# Patient Record
Sex: FEMALE | Race: WHITE | NOT HISPANIC OR LATINO
[De-identification: names, ages, dates, MRNs, and addresses within clinical notes are randomized per-mention and may not be internally consistent; named-entity substitution may affect disease eponyms.]

---

## 2018-08-07 ENCOUNTER — RECORD ABSTRACTING (OUTPATIENT)
Age: 63
End: 2018-08-07

## 2018-08-07 DIAGNOSIS — Z86.69 PERSONAL HISTORY OF OTHER DISEASES OF THE NERVOUS SYSTEM AND SENSE ORGANS: ICD-10-CM

## 2018-08-07 DIAGNOSIS — Z86.79 PERSONAL HISTORY OF OTHER DISEASES OF THE CIRCULATORY SYSTEM: ICD-10-CM

## 2018-08-07 DIAGNOSIS — Z87.898 PERSONAL HISTORY OF OTHER SPECIFIED CONDITIONS: ICD-10-CM

## 2018-08-07 LAB — HBA1C MFR BLD: 7.6

## 2018-08-07 RX ORDER — ATORVASTATIN CALCIUM 20 MG/1
20 TABLET, FILM COATED ORAL DAILY
Refills: 0 | Status: ACTIVE | COMMUNITY

## 2018-08-07 RX ORDER — INSULIN ASPART 100 [IU]/ML
100 INJECTION, SOLUTION INTRAVENOUS; SUBCUTANEOUS
Refills: 0 | Status: ACTIVE | COMMUNITY

## 2018-08-24 ENCOUNTER — APPOINTMENT (OUTPATIENT)
Dept: ENDOCRINOLOGY | Facility: CLINIC | Age: 63
End: 2018-08-24
Payer: MEDICARE

## 2018-08-24 VITALS
HEIGHT: 61 IN | DIASTOLIC BLOOD PRESSURE: 80 MMHG | WEIGHT: 147 LBS | HEART RATE: 102 BPM | SYSTOLIC BLOOD PRESSURE: 136 MMHG | BODY MASS INDEX: 27.75 KG/M2

## 2018-08-24 LAB — GLUCOSE BLDC GLUCOMTR-MCNC: 112

## 2018-08-24 PROCEDURE — 82962 GLUCOSE BLOOD TEST: CPT

## 2018-08-24 PROCEDURE — 99214 OFFICE O/P EST MOD 30 MIN: CPT | Mod: 25

## 2018-08-24 RX ORDER — VALSARTAN AND HYDROCHLOROTHIAZIDE 160; 12.5 MG/1; MG/1
160-12.5 TABLET, FILM COATED ORAL DAILY
Refills: 0 | Status: DISCONTINUED | COMMUNITY
End: 2018-08-24

## 2018-11-19 ENCOUNTER — RECORD ABSTRACTING (OUTPATIENT)
Age: 63
End: 2018-11-19

## 2018-11-19 DIAGNOSIS — Z86.39 PERSONAL HISTORY OF OTHER ENDOCRINE, NUTRITIONAL AND METABOLIC DISEASE: ICD-10-CM

## 2018-11-19 DIAGNOSIS — F70 MILD INTELLECTUAL DISABILITIES: ICD-10-CM

## 2018-11-19 DIAGNOSIS — Z83.3 FAMILY HISTORY OF DIABETES MELLITUS: ICD-10-CM

## 2018-11-19 DIAGNOSIS — Z86.79 PERSONAL HISTORY OF OTHER DISEASES OF THE CIRCULATORY SYSTEM: ICD-10-CM

## 2018-11-19 DIAGNOSIS — Z78.9 OTHER SPECIFIED HEALTH STATUS: ICD-10-CM

## 2018-11-19 DIAGNOSIS — Z82.49 FAMILY HISTORY OF ISCHEMIC HEART DISEASE AND OTHER DISEASES OF THE CIRCULATORY SYSTEM: ICD-10-CM

## 2018-11-20 ENCOUNTER — APPOINTMENT (OUTPATIENT)
Dept: ENDOCRINOLOGY | Facility: CLINIC | Age: 63
End: 2018-11-20
Payer: MEDICARE

## 2018-11-20 VITALS
SYSTOLIC BLOOD PRESSURE: 122 MMHG | WEIGHT: 141 LBS | HEART RATE: 99 BPM | DIASTOLIC BLOOD PRESSURE: 70 MMHG | BODY MASS INDEX: 26.62 KG/M2 | HEIGHT: 61 IN

## 2018-11-20 LAB — GLUCOSE BLDC GLUCOMTR-MCNC: 240

## 2018-11-20 PROCEDURE — 99214 OFFICE O/P EST MOD 30 MIN: CPT | Mod: 25

## 2018-11-20 PROCEDURE — 82962 GLUCOSE BLOOD TEST: CPT

## 2018-11-20 RX ORDER — LEVOTHYROXINE SODIUM 0.1 MG/1
100 TABLET ORAL DAILY
Refills: 0 | Status: DISCONTINUED | COMMUNITY
End: 2018-11-20

## 2018-11-20 RX ORDER — CALCIUM CARBONATE/VITAMIN D2 500 MG-125
500-125 TABLET ORAL TWICE DAILY
Refills: 0 | Status: DISCONTINUED | COMMUNITY
End: 2018-11-20

## 2019-04-16 ENCOUNTER — APPOINTMENT (OUTPATIENT)
Dept: ENDOCRINOLOGY | Facility: CLINIC | Age: 64
End: 2019-04-16
Payer: MEDICARE

## 2019-04-16 VITALS
WEIGHT: 143 LBS | BODY MASS INDEX: 26.31 KG/M2 | DIASTOLIC BLOOD PRESSURE: 80 MMHG | HEART RATE: 95 BPM | SYSTOLIC BLOOD PRESSURE: 132 MMHG | OXYGEN SATURATION: 97 % | HEIGHT: 62 IN

## 2019-04-16 LAB — GLUCOSE BLDC GLUCOMTR-MCNC: 186

## 2019-04-16 PROCEDURE — 99214 OFFICE O/P EST MOD 30 MIN: CPT | Mod: 25

## 2019-04-16 PROCEDURE — 82962 GLUCOSE BLOOD TEST: CPT

## 2019-04-16 RX ORDER — OMEPRAZOLE 20 MG/1
20 CAPSULE, DELAYED RELEASE ORAL DAILY
Refills: 0 | Status: DISCONTINUED | COMMUNITY
End: 2019-04-16

## 2019-04-16 RX ORDER — LISINOPRIL AND HYDROCHLOROTHIAZIDE TABLETS 10; 12.5 MG/1; MG/1
10-12.5 TABLET ORAL DAILY
Refills: 0 | Status: ACTIVE | COMMUNITY

## 2019-04-16 RX ORDER — ERGOCALCIFEROL 1.25 MG/1
1.25 MG CAPSULE, LIQUID FILLED ORAL
Qty: 3 | Refills: 3 | Status: ACTIVE | COMMUNITY

## 2019-04-16 RX ORDER — CALCIUM CARBONATE/VITAMIN D2 500 MG-125
500-125 TABLET ORAL TWICE DAILY
Refills: 0 | Status: ACTIVE | COMMUNITY

## 2019-04-16 RX ORDER — ALENDRONATE SODIUM 70 MG/1
70 TABLET ORAL
Qty: 12 | Refills: 1 | Status: ACTIVE | COMMUNITY

## 2019-04-16 NOTE — ASSESSMENT
[FreeTextEntry1] : 64 year old female with Type 2 DM, Hypothyroidism, HTN, hyperlipidemia and osteoporosis.  Her glycemic control is acceptable at this time.  \par \par 1.  T2DM-  continue current medication regimen.  \par 2.  hypothyroidism-   Continue LT4 88  mcg daily.  \par 3.  Hyperlipidemia-  continue statin\par 4.  HTN-  continue ACE-I.  \par 5  OP-  continue alendronate 70 mg qweek,  Oscal and vitamin D.  Repeat DXA in 9/2020

## 2019-04-16 NOTE — DATA REVIEWED
[FreeTextEntry1] : Labs:\par 4/15/2019:\par A1c 6.5%\par TSH 0.51\par LDL  54\par \par 11/15/2018:\par TSH 0.214, Free T4  1.44\par A1c 6.6\par 25(OH)D  31\par LDL  55\par \par DXA  9/24/2018\par T score:  L spine  -1.4,  left hip  -2.8, left femoral neck:  -3.2

## 2019-04-16 NOTE — HISTORY OF PRESENT ILLNESS
[FreeTextEntry1] : Patient is seen today for a routine follow up of Type 2 DM. hypothyroidism and osteoporosis.\par Quality:  type 2  DM\par Severity:  moderate\par Duration of diabetes:  since 2013\par Associated Complications/ Symptoms:  no known microvascular complications\par Modifying Factors:  Better with insulin\par \par Patient tests blood glucose 3 times per day.   Reviewed log and most BG in the low to mid 100s with range of 77-  379 mg/dl.  Has occasional hyperglycemia before lunch based on her diet.  \par \par Current Diabetic Medication Regimen:\par Metformin  mg BID\par Lantus 40 units qhs\par Novolog 14 units with meals (+ scale 2:50 over 150).  \par \par  DXA showed OP at hip.  Alendronate was started in 11/2018.\par \par

## 2019-04-16 NOTE — PHYSICAL EXAM
[No Acute Distress] : no acute distress [Well Nourished] : well nourished [Well Developed] : well developed [Normal Sclera/Conjunctiva] : normal sclera/conjunctiva [No Proptosis] : no proptosis [No LAD] : no lymphadenopathy [No Neck Mass] : no neck mass was observed [Thyroid Not Enlarged] : the thyroid was not enlarged [No Thyroid Nodules] : there were no palpable thyroid nodules [Normal Rate and Effort] : normal respiratory rhythm and effort [Normal Rate] : heart rate was normal  [Clear to Auscultation] : lungs were clear to auscultation bilaterally [Regular Rhythm] : with a regular rhythm [Murmurs] : no murmurs [Normal S1, S2] : normal S1 and S2 [Normal Insight/Judgement] : insight and judgment were intact [No Edema] : there was no peripheral edema [Normal Affect] : the affect was normal [Normal Mood] : the mood was normal

## 2019-09-18 ENCOUNTER — APPOINTMENT (OUTPATIENT)
Dept: ENDOCRINOLOGY | Facility: CLINIC | Age: 64
End: 2019-09-18
Payer: MEDICARE

## 2019-09-18 VITALS — DIASTOLIC BLOOD PRESSURE: 72 MMHG | SYSTOLIC BLOOD PRESSURE: 114 MMHG | HEIGHT: 62 IN | HEART RATE: 88 BPM

## 2019-09-18 LAB — GLUCOSE BLDC GLUCOMTR-MCNC: 114

## 2019-09-18 PROCEDURE — 99214 OFFICE O/P EST MOD 30 MIN: CPT | Mod: 25

## 2019-09-18 PROCEDURE — 82962 GLUCOSE BLOOD TEST: CPT

## 2019-09-18 RX ORDER — INSULIN GLARGINE 100 [IU]/ML
100 INJECTION, SOLUTION SUBCUTANEOUS DAILY
Qty: 1 | Refills: 3 | Status: DISCONTINUED | COMMUNITY
End: 2019-09-18

## 2019-09-18 RX ORDER — INSULIN DETEMIR 100 [IU]/ML
INJECTION, SOLUTION SUBCUTANEOUS
Refills: 0 | Status: ACTIVE | COMMUNITY

## 2019-09-18 RX ORDER — PANTOPRAZOLE 40 MG/1
40 TABLET, DELAYED RELEASE ORAL
Refills: 0 | Status: DISCONTINUED | COMMUNITY
End: 2019-09-18

## 2019-09-18 NOTE — PHYSICAL EXAM
[No Acute Distress] : no acute distress [Well Nourished] : well nourished [Well Developed] : well developed [Normal Sclera/Conjunctiva] : normal sclera/conjunctiva [No Proptosis] : no proptosis [No Neck Mass] : no neck mass was observed [No LAD] : no lymphadenopathy [Thyroid Not Enlarged] : the thyroid was not enlarged [No Thyroid Nodules] : there were no palpable thyroid nodules [Normal Rate and Effort] : normal respiratory rhythm and effort [Clear to Auscultation] : lungs were clear to auscultation bilaterally [Normal Rate] : heart rate was normal  [Normal S1, S2] : normal S1 and S2 [Regular Rhythm] : with a regular rhythm [Murmurs] : no murmurs [No Edema] : there was no peripheral edema [Normal Insight/Judgement] : insight and judgment were intact [Normal Affect] : the affect was normal [Normal Mood] : the mood was normal [Acanthosis Nigricans] : no acanthosis nigricans

## 2019-09-18 NOTE — DATA REVIEWED
[FreeTextEntry1] : Labs:\par 9/14/2019:\par TSH 2.8\par LDL 57\par A1c 6.9%\par 25(OH)D 31\par \par 4/15/2019:\par A1c 6.5%\par TSH 0.51\par LDL  54\par \par 11/15/2018:\par TSH 0.214, Free T4  1.44\par A1c 6.6\par 25(OH)D  31\par LDL  55\par \par DXA  9/24/2018\par T score:  L spine  -1.4,  left hip  -2.8, left femoral neck:  -3.2

## 2019-09-18 NOTE — ASSESSMENT
[FreeTextEntry1] : 64 year old female with Type 2 DM, Hypothyroidism, HTN, hyperlipidemia and osteoporosis.  Her glycemic control is acceptable at this time.  \par \par 1.  T2DM-  continue current insulin regimen and metformin.  \par 2.  hypothyroidism-   Euthyroid, Continue LT4 88  mcg daily.  \par 3.  Hyperlipidemia-  continue statin\par 4.  HTN-  continue ACE-I.  \par 5  OP-  continue alendronate 70 mg qweek,  Oscal and vitamin D.  Repeat DXA in 9/2020

## 2019-09-18 NOTE — HISTORY OF PRESENT ILLNESS
[FreeTextEntry1] : Patient is seen today for a routine follow up of Type 2 DM. hypothyroidism and osteoporosis.   DXA showed OP at hip.  Alendronate was started in 11/2018.\par Quality:  type 2  DM\par Severity:  moderate\par Duration of diabetes:  since 2013\par Associated Complications/ Symptoms:  no known microvascular complications\par Modifying Factors:  Better with insulin\par \par Patient tests blood glucose 3-4  times per day.     Most BG in the 90- 200 mg/dl range.  Has rare spike over 200 mg/dl.  No recent hypoglycemia. \par \par Current Diabetic Medication Regimen:\par Metformin  mg BID\par levemir 40 units qhs\par Novolog 14 units with meals (+ scale 2:50 over 150).  \par \par \par \par

## 2020-05-18 ENCOUNTER — APPOINTMENT (OUTPATIENT)
Dept: ENDOCRINOLOGY | Facility: CLINIC | Age: 65
End: 2020-05-18

## 2020-08-25 ENCOUNTER — APPOINTMENT (OUTPATIENT)
Dept: ENDOCRINOLOGY | Facility: CLINIC | Age: 65
End: 2020-08-25
Payer: MEDICARE

## 2020-08-25 PROCEDURE — 99214 OFFICE O/P EST MOD 30 MIN: CPT | Mod: 95

## 2020-08-25 RX ORDER — CHOLECALCIFEROL (VITAMIN D3) 25 MCG
25 MCG TABLET,CHEWABLE ORAL DAILY
Refills: 0 | Status: DISCONTINUED | COMMUNITY
End: 2020-08-25

## 2020-08-25 NOTE — ASSESSMENT
[FreeTextEntry1] : 65 year old female with T2DM, hypothyroidism, osteoporosis, hypertension, hyperlipidemia, and vitamin D deficiency. Glycemic control is acceptable.\par \par 1. T2DM- A1c 7.3%, occasional post prandial hyperglycemia. Not a candidate for tight control given risk of hypoglycemia.\par -Continue Metformin.\par -Continue current insulin regimen.\par -Continue BG monitoring 4x daily.\par -Repeat A1c in 3 months.\par \par 2. Hypothyroidism- euthyroid on replacement T4.\par -Continue current dose of LT4.\par -Repeat TFTs in 3 months.\par \par 3. Hyperlipidemia- LDL 92\par -Continue statin. \par -Advise low fat diet.\par \par 4. Vitamin D deficiency- on vitamin D 50,000 IU once per month\par -Add vitamin D 1000 IU, one tablet by mouth daily.\par \par 5. Osteoporosis\par -Continue alendronate.\par -Continue calcium supplement.\par -Repeat DEXA September 2020.\par \par 6. Hypertension- BP today 131/64\par -Controlled, continue current regimen.

## 2020-08-25 NOTE — HISTORY OF PRESENT ILLNESS
[FreeTextEntry1] : This visit was provided via telehealth using real-time 2-way audio visual technology. The patient, DIONNE CARDENAS , was located at [facility], [address] [place] [address] , at the time of the visit. \par The provider, CODY COLLINS, was located at the medical office located in [place], [place] at the time of the visit. The patient's nurse, Jennifer, and , Alexy, participated in the telehealth encounter. \par \par Telehealth visit performed due to COVID-19 Pandemic.\par Time started: 1:45 pm\par Time Ended: 1:57 pm\par  \par Quality: type 2 DM\par Severity: moderate\par Duration of diabetes: since 2013\par Associated Complications/ Symptoms: no known microvascular complications\par Modifying Factors: Better with insulin\par \par Current Diabetic Medication Regimen:\par Metformin  mg BID\par Levemir 40 units qhs\par Novolog 14 units with meals (+ scale 2:50 over 150). \par BG monitoring 4x daily by staff at facility. Most BG in the 90- 200 mg/dl range. Staff to fax glucose logs.\par \par Osteoporosis:\par DXA showed OP at hip. Alendronate was started in 11/2018. On vitamin D and calcium supplement.\par \par Hypothyroidism\par LT4 88 mcg daily, takes appropriately.\par \par No complaints today.

## 2020-08-25 NOTE — REVIEW OF SYSTEMS
[Recent Weight Gain (___ Lbs)] : no recent weight gain [Recent Weight Loss (___ Lbs)] : no recent weight loss

## 2020-08-25 NOTE — REASON FOR VISIT
[Follow - Up] : a follow-up visit [DM Type 2] : DM Type 2 [Hypothyroidism] : hypothyroidism [Osteoporosis] : osteoporosis [Formal Caregiver] : formal caregiver

## 2020-12-14 ENCOUNTER — APPOINTMENT (OUTPATIENT)
Dept: ENDOCRINOLOGY | Facility: CLINIC | Age: 65
End: 2020-12-14
Payer: MEDICARE

## 2020-12-14 PROCEDURE — 99214 OFFICE O/P EST MOD 30 MIN: CPT | Mod: 95

## 2020-12-14 RX ORDER — LEVOTHYROXINE SODIUM 0.09 MG/1
88 TABLET ORAL
Refills: 0 | Status: DISCONTINUED | COMMUNITY
End: 2020-12-14

## 2020-12-14 NOTE — REASON FOR VISIT
[Follow - Up] : a follow-up visit [DM Type 2] : DM Type 2 [Hypothyroidism] : hypothyroidism [Osteoporosis] : osteoporosis

## 2020-12-14 NOTE — DATA REVIEWED
[FreeTextEntry1] : Labs:\par 12/14/2020:\par Urine microalbumin ratio 21\par 25(OH)D  34\par TSH   1.29\par LDL 53\par A1c 6%\par 9/14/2019:\par TSH 2.8\par LDL 57\par A1c 6.9%\par 25(OH)D 31\par \par 4/15/2019:\par A1c 6.5%\par TSH 0.51\par LDL  54\par \par 11/15/2018:\par TSH 0.214, Free T4  1.44\par A1c 6.6\par 25(OH)D  31\par LDL  55\par \par DXA  9/24/2018\par T score:  L spine  -1.4,  left hip  -2.8, left femoral neck:  -3.2

## 2020-12-14 NOTE — ASSESSMENT
[FreeTextEntry1] : 65 year old female with Type 2 DM, Hypothyroidism, HTN, hyperlipidemia and osteoporosis.  Her diabetes is well controlled\par \par 1.  T2DM-  continue current insulin regimen and metformin.  Nursing home staff will fax glucose log for review.  \par 2.  hypothyroidism-   Euthyroid, Continue LT4 100 mcg daily.  \par 3.  Hyperlipidemia-  continue statin\par 4.  HTN-  continue ACE-I.  \par 5  OP-  continue alendronate 70 mg qweek,  Oscal and vitamin D.  Will need repeat DXA in Spring 2021 once Pandemic restrictions lessen.

## 2020-12-14 NOTE — HISTORY OF PRESENT ILLNESS
[Home] : at home, [unfilled] , at the time of the visit. [Medical Office: ([facility])___] : at the medical office located in  [Formal Caregiver] : formal caregiver [Verbal consent obtained from patient] : the patient, [unfilled] [FreeTextEntry1] : Telehealth visit conducted due to COVID-19 Pandemic.\par Time started:  2:06 PM\par Time Ended:  2:13 PM\par \par Nursing home staff participated in the call.\par Follow up of Type 2 DM, hypothyroidism and osteoporosis.   DXA showed OP at hip.  Alendronate was started in 11/2018.\par Quality:  type 2  DM\par Severity:  moderate\par Duration of diabetes:  since 2013\par Associated Complications/ Symptoms:  no known microvascular complications\par Modifying Factors:  Better with insulin\par \par Patient tests blood glucose 3-4  times per day.     \par \par Current Diabetic Medication Regimen:\par Metformin  mg BID\par levemir 40 units qhs\par Novolog 14 units with meals (+ scale 2:50 over 150) \par \par Unable to go for DXA due to Pandemic.  Nursing home does not want any outside studies done at this time.   \par Patient feels well and denies any complaints at this time.  \par

## 2020-12-14 NOTE — PHYSICAL EXAM
[Healthy Appearance] : healthy appearance [No Acute Distress] : no acute distress [Normal Sclera/Conjunctiva] : normal sclera/conjunctiva [No Proptosis] : no proptosis

## 2020-12-14 NOTE — REVIEW OF SYSTEMS
[Chest Pain] : no chest pain [Shortness Of Breath] : no shortness of breath [Polyuria] : no polyuria

## 2021-05-17 ENCOUNTER — APPOINTMENT (OUTPATIENT)
Dept: ENDOCRINOLOGY | Facility: CLINIC | Age: 66
End: 2021-05-17

## 2021-07-01 ENCOUNTER — APPOINTMENT (OUTPATIENT)
Dept: ENDOCRINOLOGY | Facility: CLINIC | Age: 66
End: 2021-07-01
Payer: MEDICARE

## 2021-07-01 DIAGNOSIS — Z79.4 LONG TERM (CURRENT) USE OF INSULIN: ICD-10-CM

## 2021-07-01 DIAGNOSIS — E55.9 VITAMIN D DEFICIENCY, UNSPECIFIED: ICD-10-CM

## 2021-07-01 PROCEDURE — 99214 OFFICE O/P EST MOD 30 MIN: CPT | Mod: 95

## 2021-07-01 NOTE — ASSESSMENT
[FreeTextEntry1] : 1. T2DM- continue current insulin regimen and metformin. Nursing home staff will fax glucose log for review. No hypoglycemia noted on recall. Nursing home staff aware to escalate if blood sugars trend in unsafe direction\par 2. hypothyroidism- Euthyroid, Continue LT4 100 mcg daily. \par 3. Hyperlipidemia- continue statin, need updated lipid panel\par 4. HTN- continue ACE-I. \par 5 OP- continue alendronate 70 mg qweek, Oscal and vitamin D. Will need repeat DXA in Spring 2021. RX faxed to facility.  \par \par RTO 11/2021, labs before next visit

## 2021-07-01 NOTE — HISTORY OF PRESENT ILLNESS
[Other Location: e.g. School (Enter Location, City,State)___] : at [unfilled], at the time of the visit. [Medical Office: ([facility])___] : at the medical office located in  [Verbal consent obtained from patient] : the patient, [unfilled] [FreeTextEntry1] : Nursing home staff participated in the call.\par Follow up of Type 2 DM, hypothyroidism and osteoporosis. DXA showed OP at hip. Alendronate was started in 11/2018.\par Quality: type 2 DM\par Severity: moderate\par Duration of diabetes: since 2013\par Associated Complications/ Symptoms: no known microvascular complications\par Modifying Factors: Better with insulin\par \par Patient tests blood glucose 3-4 times per day. \par No logs  available today \par On recall \par Fasting low 100s\par Lunch 120s-140s\par Dinner 140s\par \par Current Diabetic Medication Regimen:\par Metformin  mg BID\par levemir 40 units qhs\par Novolog 14 units with meals (+ scale 2:50 over 150) \par \par PT is ambulatory \par \par HGA1C 5/2021- 6.2\par \par Hypothyroid: TFTs WNL\par \par Vit D Def: 31.7 \par \par Unable to go for DXA due to Pandemic. Nursing home does not want any outside studies done at this time. \par Patient feels well and denies any complaints at this time.

## 2021-08-13 ENCOUNTER — NON-APPOINTMENT (OUTPATIENT)
Age: 66
End: 2021-08-13

## 2021-11-15 ENCOUNTER — APPOINTMENT (OUTPATIENT)
Dept: ENDOCRINOLOGY | Facility: CLINIC | Age: 66
End: 2021-11-15
Payer: MEDICARE

## 2021-11-15 VITALS
HEART RATE: 87 BPM | OXYGEN SATURATION: 95 % | DIASTOLIC BLOOD PRESSURE: 76 MMHG | WEIGHT: 141 LBS | HEIGHT: 62 IN | SYSTOLIC BLOOD PRESSURE: 138 MMHG | BODY MASS INDEX: 25.95 KG/M2

## 2021-11-15 PROCEDURE — 99214 OFFICE O/P EST MOD 30 MIN: CPT

## 2021-11-15 RX ORDER — OMEPRAZOLE 20 MG/1
20 CAPSULE, DELAYED RELEASE ORAL DAILY
Qty: 90 | Refills: 0 | Status: DISCONTINUED | COMMUNITY
End: 2021-11-15

## 2021-11-15 RX ORDER — METFORMIN HYDROCHLORIDE 500 MG/1
500 TABLET, COATED ORAL
Qty: 60 | Refills: 0 | Status: ACTIVE | COMMUNITY
Start: 2021-10-22

## 2021-11-15 RX ORDER — METOPROLOL SUCCINATE 25 MG/1
25 TABLET, EXTENDED RELEASE ORAL DAILY
Refills: 0 | Status: DISCONTINUED | COMMUNITY
End: 2021-11-15

## 2021-11-15 RX ORDER — LEVOTHYROXINE SODIUM 0.1 MG/1
100 TABLET ORAL
Refills: 0 | Status: DISCONTINUED | COMMUNITY
End: 2021-11-15

## 2021-11-15 RX ORDER — OMEPRAZOLE 40 MG/1
40 CAPSULE, DELAYED RELEASE ORAL AT BEDTIME
Refills: 0 | Status: DISCONTINUED | COMMUNITY
End: 2021-11-15

## 2021-11-15 RX ORDER — METFORMIN HYDROCHLORIDE 500 MG/1
500 TABLET, FILM COATED, EXTENDED RELEASE ORAL
Refills: 0 | Status: DISCONTINUED | COMMUNITY
End: 2021-11-15

## 2021-11-15 RX ORDER — METOPROLOL TARTRATE 25 MG/1
25 TABLET, FILM COATED ORAL
Qty: 30 | Refills: 0 | Status: ACTIVE | COMMUNITY
Start: 2021-11-09

## 2021-11-15 NOTE — HISTORY OF PRESENT ILLNESS
[FreeTextEntry1] : Follow up of Type 2 DM, hypothyroidism and osteoporosis.   \par DXA showed OP at hip.  Alendronate was started in 11/2018.\par \par Quality:  type 2  DM\par Severity:  moderate\par Duration of diabetes:  since 2013\par Associated Complications/ Symptoms:  no known microvascular complications\par Modifying Factors:  Better with insulin\par \par SMBG:  testing blood glucose 3-4  times per day.    Blood sugar log was not sent to this visit by the nursing home.      \par \par Current Diabetic Medication Regimen:\par Metformin  mg BID\par levemir 40 units qhs\par Novolog 14 units with meals (+ scale 2:50 over 150) \par \par  \par

## 2021-11-15 NOTE — ASSESSMENT
[FreeTextEntry1] : 64 year old female with Type 2 DM, Hypothyroidism, HTN, hyperlipidemia and osteoporosis.  Her glycemic control is acceptable at this time.  \par \par 1.  T2DM-  continue current insulin regimen and metformin.   Repeat A1c in 3 months. \par 2.  hypothyroidism-   TSH is elevated.  Will increase LT4 to 88 mcg daily.  Repeat TFTs in 3 months. \par 3.  Hyperlipidemia-  continue statin\par 4.  HTN-  continue ACE-I.  \par 5  OP-  continue alendronate 70 mg qweek,  Oscal and vitamin D.  DXA in 2021 showed improving BMD.  WIll repeat DXA in 2023.\par

## 2021-11-15 NOTE — PHYSICAL EXAM
[Healthy Appearance] : healthy appearance [No Acute Distress] : no acute distress [Normal Sclera/Conjunctiva] : normal sclera/conjunctiva [No Proptosis] : no proptosis [No Neck Mass] : no neck mass was observed [No LAD] : no lymphadenopathy [Supple] : the neck was supple [Thyroid Not Enlarged] : the thyroid was not enlarged [No Thyroid Nodules] : no palpable thyroid nodules [No Respiratory Distress] : no respiratory distress [Clear to Auscultation] : lungs were clear to auscultation bilaterally [Normal S1, S2] : normal S1 and S2 [No Murmurs] : no murmurs [Normal Rate] : heart rate was normal [Regular Rhythm] : with a regular rhythm [No Edema] : no peripheral edema [Right foot was examined, including] : right foot ~C was examined, including visual inspection with sensory and pulse exams [Left foot was examined, including] : left foot ~C was examined, including visual inspection with sensory and pulse exams [Normal] : normal [1+] : 1+ in the posterior tibialis [Normal Affect] : the affect was normal [Normal Mood] : the mood was normal [Acanthosis Nigricans] : no acanthosis nigricans [Diminished Throughout Both Feet] : normal tactile sensation with monofilament testing throughout both feet

## 2021-11-15 NOTE — DATA REVIEWED
[FreeTextEntry1] : Labs:\par 11/2/2021:\par TSH 5.65\par Free T4 1.27\par LDL 51\par A1c 5.9%\par \par 12/14/2020:\par Urine microalbumin ratio 21\par 25(OH)D  34\par TSH   1.29\par LDL 53\par A1c 6%\par 9/14/2019:\par TSH 2.8\par LDL 57\par A1c 6.9%\par 25(OH)D 31\par \par 4/15/2019:\par A1c 6.5%\par TSH 0.51\par LDL  54\par \par 11/15/2018:\par TSH 0.214, Free T4  1.44\par A1c 6.6\par 25(OH)D  31\par LDL  55\par \par DXA 8/6/2021:\par L spine T score: - 0.8, Right neck -2.8, Right total Hip:  -2.4\par \par DXA  9/24/2018\par T score:  L spine  -1.4,  left hip  -2.8, left femoral neck:  -3.2

## 2022-04-04 ENCOUNTER — APPOINTMENT (OUTPATIENT)
Dept: ENDOCRINOLOGY | Facility: CLINIC | Age: 67
End: 2022-04-04
Payer: MEDICARE

## 2022-04-04 VITALS
BODY MASS INDEX: 25.95 KG/M2 | DIASTOLIC BLOOD PRESSURE: 76 MMHG | SYSTOLIC BLOOD PRESSURE: 140 MMHG | OXYGEN SATURATION: 98 % | HEIGHT: 62 IN | WEIGHT: 141 LBS | HEART RATE: 88 BPM

## 2022-04-04 LAB — GLUCOSE BLDC GLUCOMTR-MCNC: 133

## 2022-04-04 PROCEDURE — 99214 OFFICE O/P EST MOD 30 MIN: CPT | Mod: 25

## 2022-04-04 PROCEDURE — 82962 GLUCOSE BLOOD TEST: CPT

## 2022-04-04 RX ORDER — POTASSIUM CHLORIDE 750 MG/1
10 TABLET, FILM COATED, EXTENDED RELEASE ORAL
Refills: 0 | Status: DISCONTINUED | COMMUNITY
End: 2022-04-04

## 2022-04-04 RX ORDER — FAMOTIDINE 20 MG/1
20 TABLET, FILM COATED ORAL
Qty: 30 | Refills: 0 | Status: ACTIVE | COMMUNITY
Start: 2022-03-22

## 2022-04-04 RX ORDER — POTASSIUM CHLORIDE 20 MEQ/15ML
20 MEQ/15ML SOLUTION ORAL
Qty: 225 | Refills: 0 | Status: ACTIVE | COMMUNITY
Start: 2022-01-25

## 2022-04-04 RX ORDER — RANITIDINE HYDROCHLORIDE 300 MG/1
300 CAPSULE ORAL
Refills: 0 | Status: DISCONTINUED | COMMUNITY
End: 2022-04-04

## 2022-04-04 NOTE — DATA REVIEWED
[FreeTextEntry1] : Labs:\par 3/16/2022:\par A1c 5.8%\par LDL 57\par TSH 26\par UACR 6\par \par 11/2/2021:\par TSH 5.65\par Free T4 1.27\par LDL 51\par A1c 5.9%\par \par 12/14/2020:\par Urine microalbumin ratio 21\par 25(OH)D  34\par TSH   1.29\par LDL 53\par A1c 6%\par 9/14/2019:\par TSH 2.8\par LDL 57\par A1c 6.9%\par 25(OH)D 31\par \par 4/15/2019:\par A1c 6.5%\par TSH 0.51\par LDL  54\par \par 11/15/2018:\par TSH 0.214, Free T4  1.44\par A1c 6.6\par 25(OH)D  31\par LDL  55\par \par DXA 8/6/2021:\par L spine T score: - 0.8, Right neck -2.8, Right total Hip:  -2.4\par \par DXA  9/24/2018\par T score:  L spine  -1.4,  left hip  -2.8, left femoral neck:  -3.2

## 2022-04-04 NOTE — HISTORY OF PRESENT ILLNESS
[FreeTextEntry1] : Follow up of Type 2 DM, hypothyroidism and osteoporosis.   \par DXA showed OP at hip.  Alendronate was started in 11/2018.\par Patient has PMH significant for cerebral palsy and intellectual disability and is present with family member today.  She resides at a nursing home facility. . \par \par Quality:  type 2  DM\par Severity:  moderate\par Duration of diabetes:  since 2013\par Associated Complications/ Symptoms:  no known microvascular complications\par Modifying Factors:  Better with insulin\par \par SMBG:  testing blood glucose 3-4  times per day.       Nursing home did not send glucose log with patient this visit.    BG was 50 mg/dl when recent fasting blood work was done.   \par \par Current Diabetic Medication Regimen:\par Metformin  mg BID\par levemir 40 units qhs\par Novolog 14 units with meals (+ scale 2:50 over 150) \par Dose of Synthroid was recently increased from 50 mcg daily to 75 mcg daily.    \par \par Cezar is in her usual state of health. \par  \par

## 2022-04-04 NOTE — ASSESSMENT
[FreeTextEntry1] : 67 year old female with Type 2 DM, Hypothyroidism, HTN, hyperlipidemia and osteoporosis.  Her glycemic control is likely too strict.    \par \par 1.  T2DM-    Due to fasting hypoglycemia, will reduce Levemir to 35 units qhs.   \par 2.  hypothyroidism-  LT4 recently increased.  Will need repeat TFTs in 6 weeks.   \par 3.  Hyperlipidemia-  continue statin\par 4.  HTN-  continue ACE-I.  \par 5  OP-  continue alendronate 70 mg qweek,  Oscal and vitamin D.  DXA in 2021 showed improving BMD.  WIll repeat DXA in 2023.\par  6.  Erythema around left ankle-  no signs of acute infection, but recommended follow up with podiatry within one week for reassessment (could be irritation from ankle brace she wears on the foot).    \par \par Follow up in 4 months.

## 2022-04-04 NOTE — PHYSICAL EXAM
[Healthy Appearance] : healthy appearance [No Acute Distress] : no acute distress [Normal Sclera/Conjunctiva] : normal sclera/conjunctiva [No Proptosis] : no proptosis [No Respiratory Distress] : no respiratory distress [Clear to Auscultation] : lungs were clear to auscultation bilaterally [Normal S1, S2] : normal S1 and S2 [No Murmurs] : no murmurs [Normal Rate] : heart rate was normal [Regular Rhythm] : with a regular rhythm [No Edema] : no peripheral edema [Swelling] : swollen [Erythema] : erythematous [Normal] : normal [1+] : 1+ in the posterior tibialis [Normal Affect] : the affect was normal [Normal Mood] : the mood was normal [Acanthosis Nigricans] : no acanthosis nigricans [Diminished Throughout Both Feet] : normal tactile sensation with monofilament testing throughout both feet [FreeTextEntry5] : s

## 2022-08-09 ENCOUNTER — APPOINTMENT (OUTPATIENT)
Dept: ENDOCRINOLOGY | Facility: CLINIC | Age: 67
End: 2022-08-09

## 2022-08-09 PROCEDURE — 99214 OFFICE O/P EST MOD 30 MIN: CPT | Mod: 95

## 2022-08-09 RX ORDER — LEVOTHYROXINE SODIUM 0.07 MG/1
75 TABLET ORAL
Qty: 14 | Refills: 0 | Status: DISCONTINUED | COMMUNITY
Start: 2021-11-11 | End: 2022-08-09

## 2022-08-09 RX ORDER — LEVOTHYROXINE SODIUM 0.09 MG/1
88 TABLET ORAL
Refills: 0 | Status: ACTIVE | COMMUNITY

## 2022-08-09 NOTE — DATA REVIEWED
[FreeTextEntry1] : Labs:\par 8/5/2022:\par Glucose 60\par A1c 6.2%\par TSH 1.53\par Free T4  1.27\par LDL  45\par \par \par 3/16/2022:\par A1c 5.8%\par LDL 57\par TSH 26\par UACR 6\par \par 11/2/2021:\par TSH 5.65\par Free T4 1.27\par LDL 51\par A1c 5.9%\par \par 12/14/2020:\par Urine microalbumin ratio 21\par 25(OH)D  34\par TSH   1.29\par LDL 53\par A1c 6%\par 9/14/2019:\par TSH 2.8\par LDL 57\par A1c 6.9%\par 25(OH)D 31\par \par 4/15/2019:\par A1c 6.5%\par TSH 0.51\par LDL  54\par \par 11/15/2018:\par TSH 0.214, Free T4  1.44\par A1c 6.6\par 25(OH)D  31\par LDL  55\par \par DXA 8/6/2021:\par L spine T score: - 0.8, Right neck -2.8, Right total Hip:  -2.4\par \par DXA  9/24/2018\par T score:  L spine  -1.4,  left hip  -2.8, left femoral neck:  -3.2

## 2022-08-09 NOTE — HISTORY OF PRESENT ILLNESS
[Home] : at home, [unfilled] , at the time of the visit. [Medical Office: ([facility])___] : at the medical office located in  [Formal Caregiver] : formal caregiver [Verbal consent obtained from patient] : the patient, [unfilled] [FreeTextEntry1] : Telehealth visit conducted.\par Time started:  1:29 PM\par Time Ended:  1:46 PM\par \par Follow up of Type 2 DM, hypothyroidism and osteoporosis.   \par DXA showed OP at hip.  Alendronate was started in 11/2018.\par Patient has PMH significant for cerebral palsy and intellectual disability and resides at a nursing home facility. . \par \par Quality:  type 2  DM\par Severity:  moderate\par Duration of diabetes:  since 2013\par Associated Complications/ Symptoms:  no known microvascular complications\par Modifying Factors:  Better with insulin\par \par SMBG:  testing blood glucose 3-4  times per day.       \par \par Current Diabetic Medication Regimen:\par Metformin  mg BID\par levemir 35 units qhs\par Novolog 14 units with meals (+ scale 2:50 over 150) \par Synthroid   75 mcg daily.    \par \par Cezar is in her usual state of health. \par  \par

## 2022-08-09 NOTE — ASSESSMENT
[FreeTextEntry1] : 67 year old female with Type 2 DM, Hypothyroidism, HTN, hyperlipidemia and osteoporosis.  Her diabetes is well controlled.  \par \par 1.  T2DM-    Due to fasting hypoglycemia on recent labs, will reduce Levemir to 30 units qhs.  Continue current dose of Metformin and Novolog.   \par 2.  hypothyroidism- euthyroid.  Continue current dose of LT4. \par 3.  Hyperlipidemia-  continue statin\par 4.  HTN-  continue ACE-I.  \par 5  OP-  continue alendronate 70 mg qweek,  Oscal and vitamin D.  DXA in 2021 showed improving BMD.  WIll repeat DXA in 2023.\par  \par Follow up in 6 months.

## 2022-09-12 LAB
HBA1C MFR BLD HPLC: 6.2
LDLC SERPL DIRECT ASSAY-MCNC: 44.6
TSH SERPL-ACNC: 1.53

## 2022-09-14 ENCOUNTER — APPOINTMENT (OUTPATIENT)
Dept: ENDOCRINOLOGY | Facility: CLINIC | Age: 67
End: 2022-09-14

## 2023-02-01 ENCOUNTER — APPOINTMENT (OUTPATIENT)
Dept: ENDOCRINOLOGY | Facility: CLINIC | Age: 68
End: 2023-02-01
Payer: MEDICARE

## 2023-02-01 VITALS
HEIGHT: 62 IN | OXYGEN SATURATION: 98 % | SYSTOLIC BLOOD PRESSURE: 146 MMHG | WEIGHT: 139 LBS | BODY MASS INDEX: 25.58 KG/M2 | HEART RATE: 87 BPM | DIASTOLIC BLOOD PRESSURE: 82 MMHG

## 2023-02-01 LAB — GLUCOSE BLDC GLUCOMTR-MCNC: 188

## 2023-02-01 PROCEDURE — 82962 GLUCOSE BLOOD TEST: CPT

## 2023-02-01 PROCEDURE — 99214 OFFICE O/P EST MOD 30 MIN: CPT | Mod: 25

## 2023-02-01 RX ORDER — FAMOTIDINE 40 MG/1
40 TABLET, FILM COATED ORAL
Qty: 30 | Refills: 0 | Status: DISCONTINUED | COMMUNITY
Start: 2021-11-10 | End: 2023-02-01

## 2023-02-01 NOTE — HISTORY OF PRESENT ILLNESS
[FreeTextEntry1] : Follow up of Type 2 DM, hypothyroidism and osteoporosis.   \par DXA showed OP at hip.  Alendronate was started in 11/2018.\par Patient has PMH significant for cerebral palsy and intellectual disability and resides at a nursing home facility. . \par \par Quality:  type 2  DM\par Severity:  moderate\par Duration of diabetes:  since 2013\par Associated Complications/ Symptoms:  no known microvascular complications\par Modifying Factors:  Better with insulin\par \par SMBG:  testing blood glucose 3-4  times per day.       \par \par Current Diabetic Medication Regimen:\par Metformin  mg BID\par levemir 30 units qhs\par Novolog 14 units with meals (+ scale 2:50 over 150) \par Synthroid   88 mcg daily.    \par \par Cezar is in her usual state of health. \par  \par

## 2023-02-01 NOTE — DATA REVIEWED
[FreeTextEntry1] : Labs:\par 1/26/20223:\par Glucose 90\par A1c 6.1%\par TSH 1.61\par \par 8/5/2022:\par Glucose 60\par A1c 6.2%\par TSH 1.53\par Free T4  1.27\par LDL  45\par \par 3/16/2022:\par A1c 5.8%\par LDL 57\par TSH 26\par UACR 6\par \par 11/2/2021:\par TSH 5.65\par Free T4 1.27\par LDL 51\par A1c 5.9%\par \par 12/14/2020:\par Urine microalbumin ratio 21\par 25(OH)D  34\par TSH   1.29\par LDL 53\par A1c 6%\par 9/14/2019:\par TSH 2.8\par LDL 57\par A1c 6.9%\par 25(OH)D 31\par \par DXA 8/6/2021:\par L spine T score: - 0.8, Right neck -2.8, Right total Hip:  -2.4\par \par DXA  9/24/2018\par T score:  L spine  -1.4,  left hip  -2.8, left femoral neck:  -3.2

## 2023-02-01 NOTE — ASSESSMENT
[FreeTextEntry1] : 67 year old female with Type 2 DM, Hypothyroidism, HTN, hyperlipidemia and osteoporosis. here for follow up.\par \par 1.  T2DM-   continue current insulin regimen and metformin.   Will request glucose log from nursing home. \par 2.  hypothyroidism-   Continue current dose of LT4  \par 3.  Hyperlipidemia-  continue statin\par 4.  HTN-  continue ACE-I.  \par 5  OP-  continue alendronate 70 mg qweek,  Oscal and vitamin D.   Repeat DXA at time of next follow up.  \par \par Follow up in 6 months.

## 2023-02-01 NOTE — PHYSICAL EXAM
[Healthy Appearance] : healthy appearance [No Acute Distress] : no acute distress [Normal Sclera/Conjunctiva] : normal sclera/conjunctiva [No Proptosis] : no proptosis [No Respiratory Distress] : no respiratory distress [Clear to Auscultation] : lungs were clear to auscultation bilaterally [Normal S1, S2] : normal S1 and S2 [No Murmurs] : no murmurs [Normal Rate] : heart rate was normal [Regular Rhythm] : with a regular rhythm [No Edema] : no peripheral edema [Swelling] : swollen [Erythema] : erythematous [Normal] : normal [1+] : 1+ in the posterior tibialis [Normal Affect] : the affect was normal [Normal Mood] : the mood was normal [Acanthosis Nigricans] : no acanthosis nigricans [Diminished Throughout Both Feet] : normal tactile sensation with monofilament testing throughout both feet

## 2023-08-09 ENCOUNTER — APPOINTMENT (OUTPATIENT)
Dept: ENDOCRINOLOGY | Facility: CLINIC | Age: 68
End: 2023-08-09
Payer: MEDICARE

## 2023-08-09 DIAGNOSIS — I10 ESSENTIAL (PRIMARY) HYPERTENSION: ICD-10-CM

## 2023-08-09 PROCEDURE — 99442: CPT | Mod: 95

## 2023-08-09 RX ORDER — ALENDRONATE SODIUM 70 MG/75ML
70 SOLUTION ORAL
Qty: 300 | Refills: 0 | Status: DISCONTINUED | COMMUNITY
Start: 2022-03-18 | End: 2023-08-09

## 2023-08-09 NOTE — HISTORY OF PRESENT ILLNESS
[Home] : at home, [unfilled] , at the time of the visit. [Medical Office: ([facility])___] : at the medical office located in  [Verbal consent obtained from patient] : the patient, [unfilled] [FreeTextEntry1] : Telephonic visit conducted (video would not work on the call so telephone only used) Time started:  11:36 AM Time ended:  11:52 AM  Follow up of Type 2 DM, hypothyroidism and osteoporosis.    DXA showed OP at hip.  Alendronate was started in 11/2018. Patient has PMH significant for cerebral palsy and intellectual disability and resides at a nursing home facility. .  [facility] staff assisted with this visit.   She has been doing well at the nursing home and blood sugars have been good.    Quality:  type 2  DM Severity:  moderate Duration of diabetes:  since 2013 Associated Complications/ Symptoms:  no known microvascular complications Modifying Factors:  Better with insulin  SMBG:  testing blood glucose 4  times per day.         Current Diabetic Medication Regimen: Metformin  mg BID levemir 30 units qhs Novolog 14 units with meals (+ scale 2:50 over 150)  Synthroid   88 mcg daily.

## 2023-08-09 NOTE — DATA REVIEWED
[FreeTextEntry1] : Labs: 8/3/2023: A1c 6.9% TSH 1.2  1/26/20223: Glucose 90 A1c 6.1% TSH 1.61  8/5/2022: Glucose 60 A1c 6.2% TSH 1.53 Free T4  1.27 LDL  45  3/16/2022: A1c 5.8% LDL 57 TSH 26 UACR 6  11/2/2021: TSH 5.65 Free T4 1.27 LDL 51 A1c 5.9%  12/14/2020: Urine microalbumin ratio 21 25(OH)D  34 TSH   1.29 LDL 53 A1c 6% 9/14/2019: TSH 2.8 LDL 57 A1c 6.9% 25(OH)D 31  DXA 8/6/2021: L spine T score: - 0.8, Right neck -2.8, Right total Hip:  -2.4  DXA  9/24/2018 T score:  L spine  -1.4,  left hip  -2.8, left femoral neck:  -3.2

## 2023-08-09 NOTE — ASSESSMENT
[FreeTextEntry1] : 67 year old female with Type 2 DM, Hypothyroidism, HTN, hyperlipidemia and osteoporosis here for follow up.  1.  T2DM-   continue current doses of Levemir, Novolog and metformin.   Will review glucose log once faxed to this office by nursing facility.   Obtain recent labs.    2.  hypothyroidism-   Continue current dose of LT4   3.  Hyperlipidemia-  continue Atorvastatin 4.  HTN-  continue lisinopril  5  OP-  continue alendronate 70 mg qweek, Oscal and vitamin D.   Check DXA now.     Follow up in 6 months.

## 2023-08-15 ENCOUNTER — NON-APPOINTMENT (OUTPATIENT)
Age: 68
End: 2023-08-15

## 2024-02-05 LAB
HBA1C MFR BLD HPLC: 6.8
TSH SERPL-ACNC: 1.2

## 2024-02-06 ENCOUNTER — APPOINTMENT (OUTPATIENT)
Dept: ENDOCRINOLOGY | Facility: CLINIC | Age: 69
End: 2024-02-06
Payer: MEDICARE

## 2024-02-06 DIAGNOSIS — E11.65 TYPE 2 DIABETES MELLITUS WITH HYPERGLYCEMIA: ICD-10-CM

## 2024-02-06 DIAGNOSIS — E03.9 HYPOTHYROIDISM, UNSPECIFIED: ICD-10-CM

## 2024-02-06 DIAGNOSIS — M81.0 AGE-RELATED OSTEOPOROSIS W/OUT CURRENT PATHOLOGICAL FRACTURE: ICD-10-CM

## 2024-02-06 DIAGNOSIS — E78.5 HYPERLIPIDEMIA, UNSPECIFIED: ICD-10-CM

## 2024-02-06 PROCEDURE — 99214 OFFICE O/P EST MOD 30 MIN: CPT

## 2024-02-06 NOTE — ASSESSMENT
[FreeTextEntry1] : 68 year old female with Type 2 DM, Hypothyroidism, HTN, hyperlipidemia and osteoporosis. here for follow up.  Her diabetes control is reasonable for her age and comorbidities.    1.  T2DM-   continue current insulin regimen and metformin.   Repeat A1c in 6 months.  2.  hypothyroidism-   Continue current dose of LT4   3.  Hyperlipidemia-  continue statin 4.  HTN-  continue ACE-I.   5  OP-  continue alendronate 70 mg qweek,  Oscal and vitamin D.    DXA in 2023 shows continued improvement in 2023.  Will repeat DXA in 2025 and then consider drug Holiday based on results.    Follow up in 6 months.

## 2024-02-06 NOTE — HISTORY OF PRESENT ILLNESS
[Home] : at home, [unfilled] , at the time of the visit. [Medical Office: ([facility])___] : at the medical office located in  [Verbal consent obtained from patient] : the patient, [unfilled] [FreeTextEntry1] : Telehealth visit conducted.  Time started:  10:10 AM Time ended:  10: 29 AM  Follow up of Type 2 DM, hypothyroidism and osteoporosis.    DXA showed OP at hip.  Alendronate was started in 11/2018. Patient has PMH significant for cerebral palsy and intellectual disability and resides at a nursing home facility. .  [facility] staff assisted with this visit.      Quality:  type 2  DM Severity:  moderate Duration of diabetes:  since 2013 Associated Complications/ Symptoms:  no known microvascular complications Modifying Factors:  Better with insulin  SMBG:  testing blood glucose 3 times per day.    Most BG in the 90- 200 mg/dl range.  no recent documented hypoglycemia.       Current Diabetic Medication Regimen: Metformin  mg BID levemir 30 units qhs Novolog 14 units with meals (+ scale 2:50 over 150)  Synthroid   88 mcg daily.

## 2024-02-06 NOTE — DATA REVIEWED
[FreeTextEntry1] : Labs: 2/2/2024: A1c 7.1% LDL 47 TSH 3  8/3/2023: A1c 6.9% TSH 1.2  1/26/20223: Glucose 90 A1c 6.1% TSH 1.61  8/5/2022: Glucose 60 A1c 6.2% TSH 1.53 Free T4  1.27 LDL  45  3/16/2022: A1c 5.8% LDL 57 TSH 26 UACR 6  DXA 8/28/2023: L spine T score -0.5; Right Neck -2.3 (10.8% increase in BMD) Right total Hip -2.3, left forearm -2.9  DXA 8/6/2021: L spine T score: - 0.8, Right neck -2.8, Right total Hip:  -2.4  DXA  9/24/2018 T score:  L spine  -1.4,  left hip  -2.8, left femoral neck:  -3.2

## 2024-07-16 LAB
HBA1C MFR BLD HPLC: 7.1
LDLC SERPL DIRECT ASSAY-MCNC: 37

## 2024-07-17 ENCOUNTER — APPOINTMENT (OUTPATIENT)
Dept: ENDOCRINOLOGY | Facility: CLINIC | Age: 69
End: 2024-07-17
Payer: MEDICARE

## 2024-07-17 VITALS
OXYGEN SATURATION: 98 % | SYSTOLIC BLOOD PRESSURE: 136 MMHG | RESPIRATION RATE: 16 BRPM | BODY MASS INDEX: 25.21 KG/M2 | DIASTOLIC BLOOD PRESSURE: 82 MMHG | HEIGHT: 62 IN | WEIGHT: 137 LBS | HEART RATE: 85 BPM

## 2024-07-17 DIAGNOSIS — E11.65 TYPE 2 DIABETES MELLITUS WITH HYPERGLYCEMIA: ICD-10-CM

## 2024-07-17 DIAGNOSIS — I10 ESSENTIAL (PRIMARY) HYPERTENSION: ICD-10-CM

## 2024-07-17 DIAGNOSIS — E03.9 HYPOTHYROIDISM, UNSPECIFIED: ICD-10-CM

## 2024-07-17 DIAGNOSIS — M81.0 AGE-RELATED OSTEOPOROSIS W/OUT CURRENT PATHOLOGICAL FRACTURE: ICD-10-CM

## 2024-07-17 DIAGNOSIS — E78.5 HYPERLIPIDEMIA, UNSPECIFIED: ICD-10-CM

## 2024-07-17 LAB — GLUCOSE BLDC GLUCOMTR-MCNC: 218

## 2024-07-17 PROCEDURE — 99214 OFFICE O/P EST MOD 30 MIN: CPT

## 2024-07-17 PROCEDURE — G2211 COMPLEX E/M VISIT ADD ON: CPT

## 2024-07-17 PROCEDURE — 82962 GLUCOSE BLOOD TEST: CPT

## 2024-07-17 RX ORDER — PANTOPRAZOLE 40 MG/1
40 TABLET, DELAYED RELEASE ORAL
Refills: 0 | Status: ACTIVE | COMMUNITY

## 2024-07-17 RX ORDER — POTASSIUM CHLORIDE 750 MG/1
10 TABLET, FILM COATED, EXTENDED RELEASE ORAL
Refills: 0 | Status: ACTIVE | COMMUNITY

## 2024-07-17 RX ORDER — INSULIN LISPRO 100 U/ML
100 INJECTION, SOLUTION INTRAVENOUS; SUBCUTANEOUS
Refills: 0 | Status: ACTIVE | COMMUNITY

## 2024-07-17 RX ORDER — INSULIN DEGLUDEC INJECTION 100 U/ML
100 INJECTION, SOLUTION SUBCUTANEOUS
Qty: 30 | Refills: 3 | Status: ACTIVE | COMMUNITY

## 2025-01-14 ENCOUNTER — APPOINTMENT (OUTPATIENT)
Dept: ENDOCRINOLOGY | Facility: CLINIC | Age: 70
End: 2025-01-14
Payer: MEDICARE

## 2025-01-14 VITALS
WEIGHT: 131 LBS | HEART RATE: 84 BPM | BODY MASS INDEX: 24.11 KG/M2 | OXYGEN SATURATION: 97 % | HEIGHT: 62 IN | RESPIRATION RATE: 16 BRPM | SYSTOLIC BLOOD PRESSURE: 140 MMHG | DIASTOLIC BLOOD PRESSURE: 66 MMHG

## 2025-01-14 DIAGNOSIS — E03.9 HYPOTHYROIDISM, UNSPECIFIED: ICD-10-CM

## 2025-01-14 DIAGNOSIS — E78.5 HYPERLIPIDEMIA, UNSPECIFIED: ICD-10-CM

## 2025-01-14 DIAGNOSIS — M81.0 AGE-RELATED OSTEOPOROSIS W/OUT CURRENT PATHOLOGICAL FRACTURE: ICD-10-CM

## 2025-01-14 DIAGNOSIS — E11.65 TYPE 2 DIABETES MELLITUS WITH HYPERGLYCEMIA: ICD-10-CM

## 2025-01-14 PROCEDURE — 99214 OFFICE O/P EST MOD 30 MIN: CPT

## 2025-01-14 PROCEDURE — G2211 COMPLEX E/M VISIT ADD ON: CPT

## 2025-01-14 RX ORDER — LEVOTHYROXINE SODIUM 0.07 MG/1
75 TABLET ORAL
Refills: 0 | Status: ACTIVE | COMMUNITY

## 2025-01-14 RX ORDER — ATORVASTATIN CALCIUM 10 MG/1
10 TABLET, FILM COATED ORAL
Refills: 0 | Status: ACTIVE | COMMUNITY